# Patient Record
Sex: MALE
[De-identification: names, ages, dates, MRNs, and addresses within clinical notes are randomized per-mention and may not be internally consistent; named-entity substitution may affect disease eponyms.]

---

## 2019-12-04 ENCOUNTER — HOSPITAL ENCOUNTER (OUTPATIENT)
Dept: HOSPITAL 92 - BICCT | Age: 49
Discharge: HOME | End: 2019-12-04
Attending: FAMILY MEDICINE
Payer: COMMERCIAL

## 2019-12-04 DIAGNOSIS — M48.07: ICD-10-CM

## 2019-12-04 DIAGNOSIS — M47.26: Primary | ICD-10-CM

## 2019-12-04 DIAGNOSIS — M48.061: ICD-10-CM

## 2019-12-04 PROCEDURE — 72131 CT LUMBAR SPINE W/O DYE: CPT

## 2019-12-04 NOTE — CT
CT LUMBAR SPINE WITHOUT CONTRAST:



INDICATIONS:

Lumbar radiculopathy; back injury while at work with severe low back pain extending down right leg



COMPARISON:

MR of the lumbar spine dated January 29, 2016



TECHNIQUE:

Multiple CT images were obtained of the lumbar spine without contrast. Axial, coronal, and sagittal r
eformatted images were constructed from the raw data.



FINDINGS:

Visualized retroperitoneal and paravertebral soft tissues: Within normal limits



Spinal alignment: Within normal limits.



Spinal instrumentation or postsurgical change: There is a right hemilaminectomy at L5. There is an un
united transverse process on the right at L1.



At L5-S1, there is a stable asymmetric to the right disc osteophyte complex inducing lengthening wors
ening mild-to-moderate right and mild left neural foraminal narrowing. There is mild right lateral

recess narrowing suspected involving the broad-based disc osteophyte complex. This appears similar to
 the comparison exam..



At L4-5, there is a broad-based bulge with facet hypertrophy inducing mild central canal narrowing wi
th moderate left and moderate to severe right neural foraminal narrowing. This appears slightly

progressed from the prior exam.



At L3-4, there is a broad-based bulge with facet hypertrophy and loss of disc space height inducing m
ild bilateral neural foraminal narrowing and mild central canal narrowing



At L2-3, there is a suspected broad-based bulge with a superimposed right paracentral protrusion pote
ntially inducing right lateral recess narrowing and mild central canal narrowing. There is mild

bilateral neural foraminal narrowing. This appears new from the comparison exam.



At L1-L2, there is no appreciable osseous central canal or neural foraminal narrowing.



At T12-L1, there is no appreciable central canal or neuroforaminal narrowing.



IMPRESSION:

1.  Worsening multilevel spondylosis of the lumbar spine at L4-5 and L2-3. An MRI of the lumbar spine
 would be helpful to evaluate degree of central canal and neural foraminal narrowing related to

disc material.

2. Stable moderate to severe right and moderate left neural foraminal narrowing at L4-5.

3. Stable mild to moderate right and mild left neural foraminal narrowing at L5-S1.

4. Suspected right paracentral protrusion at L2-3 potentially narrowing the right lateral recess and 
central canal as above. There is mild bilateral neural foraminal narrowing at L2-3 which appears

new.

 



Reported By: Fab Strange 

Electronically Signed:  12/4/2019 3:16 PM

## 2020-03-17 ENCOUNTER — HOSPITAL ENCOUNTER (OUTPATIENT)
Dept: HOSPITAL 92 - BICMRI | Age: 50
Discharge: HOME | End: 2020-03-17
Attending: NEUROLOGICAL SURGERY
Payer: COMMERCIAL

## 2020-03-17 DIAGNOSIS — M51.16: Primary | ICD-10-CM

## 2020-03-17 DIAGNOSIS — M48.061: ICD-10-CM

## 2020-03-17 DIAGNOSIS — M48.07: ICD-10-CM

## 2020-03-17 PROCEDURE — 72158 MRI LUMBAR SPINE W/O & W/DYE: CPT

## 2020-03-17 PROCEDURE — A9579 GAD-BASE MR CONTRAST NOS,1ML: HCPCS

## 2020-05-22 ENCOUNTER — HOSPITAL ENCOUNTER (OUTPATIENT)
Dept: HOSPITAL 92 - LABBT | Age: 50
Discharge: HOME | End: 2020-05-22
Attending: NEUROLOGICAL SURGERY
Payer: COMMERCIAL

## 2020-05-22 VITALS — BODY MASS INDEX: 29.5 KG/M2

## 2020-05-22 DIAGNOSIS — Z01.812: Primary | ICD-10-CM

## 2020-05-22 DIAGNOSIS — Z11.59: ICD-10-CM

## 2020-05-22 DIAGNOSIS — M54.16: ICD-10-CM

## 2020-05-22 PROCEDURE — U0003 INFECTIOUS AGENT DETECTION BY NUCLEIC ACID (DNA OR RNA); SEVERE ACUTE RESPIRATORY SYNDROME CORONAVIRUS 2 (SARS-COV-2) (CORONAVIRUS DISEASE [COVID-19]), AMPLIFIED PROBE TECHNIQUE, MAKING USE OF HIGH THROUGHPUT TECHNOLOGIES AS DESCRIBED BY CMS-2020-01-R: HCPCS

## 2020-05-22 PROCEDURE — 87635 SARS-COV-2 COVID-19 AMP PRB: CPT

## 2020-12-02 NOTE — MRI
----- Message from Lennie Sevilla MD sent at 12/2/2020  1:03 PM CST -----  Please call mom to remind her that Padmini is due to have a repeat lead level drawn now (elevated 3 months ago).    Thanks!     MR the lumbar spine with and without contrast



INDICATION: 49-year-old male with lumbar radiculopathy; lower right-sided back pain with pain and num
bness that extends down the right leg since placing a box standing hurting his back in October 2019. History of lumbar spinal surgery in 2010 and 2016.



COMPARISON: MR the lumbar spine without contrast dated January 29, 2016



TECHNIQUE: Multiplanar multisequence MR images were obtained of lumbar spine with and without IV cont
rast.



Contrast: 20 cc of MultiHance.



FINDINGS:



Bone marrow: There is a herniated Schmorl's node involving inferior endplate of L5. Bone marrow signa
l intensity otherwise appears within normal limits.



Distal spinal cord and conus: Normal.  Conus is seen to terminate at the T12-L1 level.



Visualized retroperitoneum and paraspinal soft tissues: Normal. No lymphadenopathy demonstrated.



Vertebral levels:



L5-S1: There is an asymmetric to the right disc osteophyte complex with loss of disc space height ind
ucing mild neural foraminal encroachment, right greater than left.



L4-5: There is postprocedural change of a right hemilaminotomy at L4-5. There is an asymmetric to the
 right broad-based disc bulge with facet hypertrophy and loss of disc space height inducing mild

bilateral neural foraminal narrowing, right greater than left. The asymmetric to the right disc bulge
 and facet hypertrophy does cause moderate to severe right lateral recess narrowing that is much

more pronounced than on the prior examination. This has potential for impingement of the traversing r
ight L5 nerve root.



L3-4: There is a broad-based disc bulge with facet hypertrophy inducing mild neural foraminal encroac
hment



L2-3: There is mild broad-based bulge inducing mild central canal narrowing but no appreciable neural
 foraminal narrowing.



L1-L2: No appreciable central canal or neuroforaminal narrowing.



T12-L1: No appreciable central canal or neuroforaminal narrowing.



Postcontrast series: No abnormal enhancement demonstrated.





IMPRESSION:

1. Worsening moderate to severe right lateral recess narrowing due to a slightly more pronounced, asy
mmetric to the right, broad-based disc disc bulge at L4-5 inducing moderate to severe right lateral

recess narrowing with potential for impingement of the traversing right L5 nerve root.

2. Mild bilateral neural foraminal narrowing at L5-S1 and L4-5 appears similar to the prior exam



Reported By: Fab Strange 

Electronically Signed:  3/17/2020 2:11 PM